# Patient Record
Sex: FEMALE | Race: WHITE | NOT HISPANIC OR LATINO | Employment: FULL TIME | RURAL
[De-identification: names, ages, dates, MRNs, and addresses within clinical notes are randomized per-mention and may not be internally consistent; named-entity substitution may affect disease eponyms.]

---

## 2021-01-14 ENCOUNTER — TELEPHONE (OUTPATIENT)
Dept: URGENT CARE | Facility: CLINIC | Age: 56
End: 2021-01-14

## 2021-01-18 ENCOUNTER — TELEPHONE (OUTPATIENT)
Dept: URGENT CARE | Facility: CLINIC | Age: 56
End: 2021-01-18

## 2021-01-18 DIAGNOSIS — N39.0 URINARY TRACT INFECTION WITHOUT HEMATURIA, SITE UNSPECIFIED: Primary | ICD-10-CM

## 2021-01-18 RX ORDER — FLUCONAZOLE 150 MG/1
150 TABLET ORAL ONCE
Qty: 1 TABLET | Refills: 0 | Status: SHIPPED | OUTPATIENT
Start: 2021-01-18 | End: 2021-01-18

## 2021-01-18 NOTE — TELEPHONE ENCOUNTER
Pt requesting x1 diflucan, states she normally has to take this or she gets a yeast infection with antibiotic Tx. Wants it to be sent to [facility], [place]. Sent at this time.

## 2022-01-14 ENCOUNTER — TELEPHONE (OUTPATIENT)
Dept: URGENT CARE | Facility: CLINIC | Age: 57
End: 2022-01-14

## 2022-01-14 DIAGNOSIS — R09.81 NASAL CONGESTION: Primary | ICD-10-CM

## 2022-01-14 RX ORDER — FLUTICASONE PROPIONATE 50 MCG
2 SPRAY, SUSPENSION (ML) NASAL DAILY
Qty: 11.1 ML | Refills: 0 | Status: SHIPPED | OUTPATIENT
Start: 2022-01-14

## 2022-01-14 RX ORDER — PREDNISONE 20 MG/1
40 TABLET ORAL DAILY
Qty: 10 TABLET | Refills: 0 | Status: SHIPPED | OUTPATIENT
Start: 2022-01-14 | End: 2022-01-19

## 2022-01-14 NOTE — TELEPHONE ENCOUNTER
Patient called and stated was had tested positive for covid and is to return to work Monday, but states her sinuses are killing her and is asking if antibiotic can be sent in for her to start on, also stated that she is extremely fatigued and head is cloudy.     BILLY    [facility]    Call back # [phone]

## 2022-01-15 NOTE — TELEPHONE ENCOUNTER
Spoke with patient and she is out of Flonase and also one day left on steroids, she is taking mucinex and I advised her to get the all in one mucinex and to alternate tylenol and ibuprofen for her bodyaches and fever, she is also asking if work note can be extended she is supposed to go back tomorrow but with her still running a fever and feeling as bad as she does she is not able to return to work.     [facility]     Patient has been given insstructions given to me verbally to pass along to patient and she voiced understanding.